# Patient Record
Sex: FEMALE | Race: WHITE | NOT HISPANIC OR LATINO | Employment: OTHER | URBAN - METROPOLITAN AREA
[De-identification: names, ages, dates, MRNs, and addresses within clinical notes are randomized per-mention and may not be internally consistent; named-entity substitution may affect disease eponyms.]

---

## 2017-02-17 RX ORDER — SELEXIPAG 800 UG/1
TABLET, COATED ORAL
Qty: 60 TABLET | Refills: 4 | Status: SHIPPED | OUTPATIENT
Start: 2017-02-17

## 2018-05-08 ENCOUNTER — TELEPHONE (OUTPATIENT)
Dept: GASTROENTEROLOGY | Facility: CLINIC | Age: 58
End: 2018-05-08

## 2018-05-08 NOTE — TELEPHONE ENCOUNTER
Spoke with patient and scheduled new patient appointment with Dr. Wallace.    Patient confirmed appointment and reminder mailed out.

## 2018-05-21 ENCOUNTER — TELEPHONE (OUTPATIENT)
Dept: ENDOSCOPY | Facility: HOSPITAL | Age: 58
End: 2018-05-21

## 2018-05-21 ENCOUNTER — TELEPHONE (OUTPATIENT)
Dept: GASTROENTEROLOGY | Facility: CLINIC | Age: 58
End: 2018-05-21

## 2018-05-21 ENCOUNTER — OFFICE VISIT (OUTPATIENT)
Dept: GASTROENTEROLOGY | Facility: CLINIC | Age: 58
End: 2018-05-21
Payer: MEDICAID

## 2018-05-21 VITALS
HEIGHT: 69 IN | BODY MASS INDEX: 22.79 KG/M2 | SYSTOLIC BLOOD PRESSURE: 95 MMHG | HEART RATE: 58 BPM | WEIGHT: 153.88 LBS | DIASTOLIC BLOOD PRESSURE: 63 MMHG

## 2018-05-21 DIAGNOSIS — Z12.11 SPECIAL SCREENING FOR MALIGNANT NEOPLASMS, COLON: Primary | ICD-10-CM

## 2018-05-21 DIAGNOSIS — R19.8 ALTERNATING CONSTIPATION AND DIARRHEA: ICD-10-CM

## 2018-05-21 DIAGNOSIS — R10.9 CENTRAL ABDOMINAL PAIN: Primary | ICD-10-CM

## 2018-05-21 DIAGNOSIS — R11.2 NAUSEA AND VOMITING, INTRACTABILITY OF VOMITING NOT SPECIFIED, UNSPECIFIED VOMITING TYPE: ICD-10-CM

## 2018-05-21 DIAGNOSIS — K21.00 GASTROESOPHAGEAL REFLUX DISEASE WITH ESOPHAGITIS: ICD-10-CM

## 2018-05-21 DIAGNOSIS — R13.19 ESOPHAGEAL DYSPHAGIA: ICD-10-CM

## 2018-05-21 DIAGNOSIS — R15.1 FECAL SOILING: ICD-10-CM

## 2018-05-21 PROCEDURE — 99214 OFFICE O/P EST MOD 30 MIN: CPT | Mod: PBBFAC | Performed by: NURSE PRACTITIONER

## 2018-05-21 PROCEDURE — 99999 PR PBB SHADOW E&M-EST. PATIENT-LVL IV: CPT | Mod: PBBFAC,,, | Performed by: NURSE PRACTITIONER

## 2018-05-21 PROCEDURE — 99205 OFFICE O/P NEW HI 60 MIN: CPT | Mod: S$PBB,,, | Performed by: NURSE PRACTITIONER

## 2018-05-21 RX ORDER — DIGOXIN 125 MCG
125 TABLET ORAL DAILY
COMMUNITY

## 2018-05-21 RX ORDER — POLYETHYLENE GLYCOL 3350, SODIUM SULFATE ANHYDROUS, SODIUM BICARBONATE, SODIUM CHLORIDE, POTASSIUM CHLORIDE 236; 22.74; 6.74; 5.86; 2.97 G/4L; G/4L; G/4L; G/4L; G/4L
4 POWDER, FOR SOLUTION ORAL ONCE
Qty: 4000 ML | Refills: 0 | Status: SHIPPED | OUTPATIENT
Start: 2018-05-21 | End: 2018-05-21

## 2018-05-21 RX ORDER — MYCOPHENOLATE MOFETIL 500 MG/1
500 TABLET ORAL 2 TIMES DAILY
COMMUNITY

## 2018-05-21 RX ORDER — MIRTAZAPINE 30 MG/1
30 TABLET, FILM COATED ORAL NIGHTLY
COMMUNITY

## 2018-05-21 NOTE — LETTER
May 21, 2018      Charlette Aguila MD  [address]  [facility] [address]           [facility] - Gastroenterology  [address]  [facility] [address]  Phone: [phone]  Fax: [phone]          Patient: Ewa Mills   MR Number: [id number]   YOB: 1960   Date of Visit: 5/21/2018       Dear Dr. Charlette Aguila:    Thank you for referring Ewa Mills to me for evaluation. Attached you will find relevant portions of my assessment and plan of care.    If you have questions, please do not hesitate to call me. I look forward to following Ewa Mills along with you.    Sincerely,    Sharon Wallace M.D.  Medical Director, GI [facility]  Department of Gastroenterology    ANY De, FNP-C  GI [facility]  Department of Gastroenterology    Enclosure  CC:  No Recipients    If you would like to receive this communication electronically, please contact [email or web address] or [phone] to request more information on [facility] access.    For providers and/or their staff who would like to refer a patient to [facility], please contact us through our one-stop-shop provider referral line, [facility], at [phone].    If you feel you have received this communication in error or would no longer like to receive these types of communications, please e-mail [email or web address]

## 2018-05-21 NOTE — TELEPHONE ENCOUNTER
Please review this case w anesthesia to make sure they do not want any additional records prior to the procedure. PT w CHF, pulm HTN on meds. Reports chest pain while swallowing.      Thank you so much  ML

## 2018-05-21 NOTE — PATIENT INSTRUCTIONS
For gas and bloating:  Eliminate all forms of dairy/lactose (milk, cheese, butter, creamers, ice cream etc) and artificial sweeteners (splenda, equal, stevia, truvia, crystal lite, diet sodas, sugar free candy/gum etc) from your diet for 10 days to see if your symptoms improve.    For nausea and vomiting:  Start the gastroparesis diet (see your handout provided)  We have referred you to the gastro dietitian to help with this.    For abdominal pain:  Take over the counter IBgard.     For bowel habits:  Start over the counter metamucil tablets: once daily then increase to twice daily after two weeks if needed.     For stool accidents/leakage:   Start kegel exercises (see the handout provided).

## 2018-05-21 NOTE — TELEPHONE ENCOUNTER
Per Dr. Curry (pul).  PT w NYHA functional class 3 PH w chronic heart failure. Stable symptoms.

## 2018-05-21 NOTE — PROGRESS NOTES
[facility] Consultation [facility]    Reason for Consult:    Chief Complaint   Patient presents with    Heartburn    Chest Pain    Dysphagia    Nausea    Emesis    Abdominal Pain    Gas    Bloated    Diarrhea    Constipation    Other     stool leakage         PCP:   Jalil Zurita   [address] [facility] / [place] [address]    Referring MD:  Charlette Aguila Md  [address] [place] [address]     Previous GI:Dr. Serrato ([id number])     Pulmonology: Dr. Jalil Zurita     Cardiology: Dr. Marissa Martino    HPI:  Ewa Mills is a 57 y.o. female with a  PMH of systemic sclerosis, CREST, pulmonary HTN, neuropathy, CHF referred to motility clinic for second opinion regarding the following problems:    GERD.  Patient reports bothersome heartburn  Retrosternal pyrosis:yes  Regurgitation:sometimes  Belching:yes  Onset: 10 years  Frequency: 3-4 days weekly  Improve with: some improvement with omeprazole 40 mg BID. No improvement with pantoprazole BID   Upright symptoms: yes  Nocturnal symptoms:  Yes. worse  Hoarseness:yes  Cough:no  Throat clearing:no  Food Triggers:none  Caffeine intake:no  Sleeps propped up on pillows  Avoids eating prior to bedtime.      Chest pain. Reports bothersome chest pain and spasms of esophagus. Feels pain in RU chest with swallowing  Onset: 6 months ago  Frequency: few times weekly       Triggers (cold fluids, caffeine, smoking, ETOH):swallowing. Brian cold fluids  Consumes mostly soft foods and liquids:regular diet  Caffeine intake:none  Negative cardiac workup:  Followed by cardiologist Dr Marissa Martino in [place]. Last visit last weel. Was told s/s are r/t pulmonary disease. Has CHF.  2D echo few weeks ago. ?heart cath? few weeks ago. Right heart cath 2014. EKD 2013. Stress test 2013.     Some improvement in dysphagia with diltiazem 360 mg daily but no improvement in pain.   Has not tried nifedipine, peppermint oil, isosorbide  dinitrate, sildenafil, trazodone, botox    Dysphagia.  Reports difficulty swallowing.  Onset of symptoms:4-5 years  Problems with solids:yes  Problems with liquids:yes  Choking while eating:no  Coughing while eating: no  Location: upper esophagus, distal esophagus  Frequency:  daily  Improves with:swallow precautions, washing with mountain dew. Sometimes has to spit materials up. No improvement with esophageal dilation  History of food impactions requiring ED visit:no  History of allergies:medications  History of seasonal allergies:no  History of food allergies:no  History of eczema:no    Nausea.  Early satiety.  Frequency:few days weekly  Onset: few weeks    Vomiting  Frequency:only if spitting up esophageal bolus  Onset:1 month  Timing of vomiting:  Within 30 min of eating:yes   Within 3 hours after eating: no    Symptoms occur within 10 minutes of finishing a meal:yes  Regurgitate lacks sour or bitter taste:yes  Regurgitate described as tasting similar to the food recently ingested:yes. Then acidic afterwards  Related to belching:no    Cyclical episodes of n/v with symptom free intervals between episodes:no  History of migraines:no PH. No FH  Marijuana use:no  Unusual bathing behaviors:no    Improves with:  Allergic to zofran (rash)  Has not tried phenergan, compazine, reglan, domperidone, scopolamine patch    Gastroparesis diagnosed: 2016  Etiology:    Dm:no  Idopathic  Postsurgical:no  Parkinsonism:no  Amyloidosis:no  Paraneoplastic disease:no  Scleroderma: yes  Mesenteric ischemia:no    History of:  Prior gastric or bariatric surgery: no  Diabetes mellitus: no  Thyroid dysfunction: hypothyroidism  Neurological disease: no   Autoimmune disorders: scleroderm    Number of GP related hospitalization in the past year: no  Number of GP related ED visit in the past year: no    Interventions: (pyloroplasty, botox, GP):no  No Gastric Stimulator      Curenlty on following medications that may affect gastric emptying:    Narcotics (morphine, oxycodone, tapentadol, less with tramadol):no  Anticholinergics: no  Cyclosporine:no  Diabetic medications (GLP-1 analogs, Exenatide, Lixisenatide, Livaglutide, Albiglutide, DulaglutatidePramlintide - SymlinPen (injection)):no    Abdominal pain. Reports abdominal pain  Character:ache  Location:central  Frequency: daily   Duration:constant  Onset:3 weeks ago  Worse with:no triggers identified  Improves with:some improvement with topical essential oils.  Associated with Bm: no  Has not tried IBgard, Bentyl, Levsin, Levbid.  Antidepressants:Mirazepine 30 mg daily  Using narcotic pain medication: no     Gas and bloating. Worse in the last two weeks   Bloating: yes  Excessive gas: yes  Abdominal distension: yes   Symptoms get worse after meals:yes  Symptoms get worse as the day progresses:  no  Consumes lactose:yes  Consumes artificial sugars:no    Diarrhea. X two weeks. 3 times weekly with 3 bristol type 7 stools/day.   No nocturnal stools. No meds    Constipation. X few weeks.  3 days weekly with 1 bristol type 3 BM/day. No meds. No fiber supplements.    Stool leakage. Slimy light yellow-brown material/stool. Daily. Small amounts x 2 months.    Prior to few weeks ago with normal BMs. With urgency x 6 months.    Anxiety>depression. Situational. Feels it is well controlled. Prays a lot.  Has seen psychology 20 yrs ago for situational anxiety. Taking mirtazapine 30 mg daily for abd pain per GI Dr. Serrato.    Some insomnia. No meds. Has seen sleep specialist last year.      Systemic sclerosis. CREST, Sjogren's. Raynauds, Pulmonary HTN. Taking cellcept 1500 mg BID, plaquenil 200 mg BID, Uptravi 800 mg BID, and adcirca 20 mg BID per Dr. Aguila and      Neuropathy. Left foot. Taking Neurontin 600 mg AM and 900 mg HS per PCP.    Hypothyroidism. Taking synthroid per PCP.    H/o weight loss. Currently weight stable. Taking ensure PRN per local dietitian's recommendations.     Denies  dysphagia, GERD, nausea, vomiting, early satiety, abdominal pain, bloating, diarrhea, constipation,       Total visit time was 90 minutes, more than 50% of which was spent in face-to-face counseling with patient regarding symptoms, diagnostic results, prognosis, risks and benefits of treatment options, instructions for management, importance of compliance with chosen treatment options, risk factor reduction, stress reduction, coping strategies.      Previous Studies:   CT angio abd with bilat extrem runoff 5/15/18: slightly prominent spleen at 13 cm. S/p hysterectomy. Mild levo curvature of lumbar spine. Spine degeneration. Minimal atelectasis.   EKG 5/14/18: sinus andra with 1 st degree AV block w/ premature supraventricular complexes. Incomplete right BBB. Inferior and anterolateral ischemia.   Cardiac benny 4/25/18: severe pulm HTN  DEVYN 4/20/18: mild moderate pulmonary HTN.   *EGD 2017: food in stomach, gastric ulcers  Chest xray 10/19/16: NL  GES 9/16/16: delayed gastric emptying time. T ½ 123.62 min. GERD and/ or HH (retained activity in distal esophagus).  Esophageal manometry 6/4/16: aperistalsis. IRP NL 2.1; 100% failed contractions. No HH.   EGD 4/19/16: LA grade C esophagitis. Gastric erythema (chr inactive gastritis). 3 small non bleeding duodenal angioectasias.   Pelvic us 2/3/16: limited access of left ovary. NL  *colon >3 years ago: ?    Labs:   4/10/18: CMP GFR low 56 ml/min  cortisol NL  BNP high 1,110  CBC hgb low 11.8, mch low 25.9, RDW high 17.0  8/14/17: TSH high 7.6    Relevant surgeries:  PEG x 9 months (2014)    *per pt report      ROS:  ROS   Constitutional: No fevers, no chills, no night sweats, no weight loss  ENT: No congestion, + rhinorrhea, no chronic sinus problems  CV: + chest pain, +palpitations  Pulm: No cough, + shortness of breath  Ophtho: No blurry vision, no eye redness  GI: see HPI  Derm: No rash  Heme: No lymphadenopathy, no bruising  MSK: +joint pain, no joint swelling, no  Raynauds  : No dysuria, no frequent urination, no blood in urine  Endo: No hot or cold intolerance  Neuro: No dizziness, no syncope, no seizure  Psych: + anxiety, no depression        Medical History:   Past Medical History:   Diagnosis Date    CHF (congestive heart failure)     CREST syndrome     Gastroparesis     GERD (gastroesophageal reflux disease)     Hypothyroidism     Neuropathy     Pulmonary HTN     Sjogren's disease         Surgical History:   Past Surgical History:   Procedure Laterality Date     SECTION      x 4    CHOLECYSTECTOMY      COLONOSCOPY      HYSTERECTOMY      UPPER GASTROINTESTINAL ENDOSCOPY          Family History:   Family History   Problem Relation Age of Onset    Diabetes Mother     Heart disease Mother     Rheum arthritis Mother     Diabetes Father     Heart disease Father     Colon cancer Father 62    Rheum arthritis Father     Rheum arthritis Maternal Grandmother     Celiac disease Neg Hx     Cirrhosis Neg Hx     Crohn's disease Neg Hx     Cystic fibrosis Neg Hx     Esophageal cancer Neg Hx     Hemochromatosis Neg Hx     Liver cancer Neg Hx     Liver disease Neg Hx     Rectal cancer Neg Hx     Stomach cancer Neg Hx     Kimani's disease Neg Hx     Lymphoma Neg Hx     Tuberculosis Neg Hx     Scleroderma Neg Hx     Multiple sclerosis Neg Hx     Melanoma Neg Hx     Lupus Neg Hx     Psoriasis Neg Hx     Skin cancer Neg Hx         Social History:   Social History     Social History    Marital status:      Spouse name: N/A    Number of children: N/A    Years of education: N/A     Social History Main Topics    Smoking status: Never Smoker    Smokeless tobacco: Never Used    Alcohol use No    Drug use: No    Sexual activity: Not Asked     Other Topics Concern    None     Social History Narrative    None        Review of patient's allergies indicates:   Allergen Reactions    Ondansetron hcl (pf) Rash     Pt given iv zofran, pt  "with redness above site and itching    Morphine Itching    Morphine sulfate      Itchy (skin)^    Tramadol Itching       Current Outpatient Prescriptions   Medication Sig Dispense Refill    ADCIRCA 20 mg Tab once daily.       diltiazem (CARDIZEM) 60 MG tablet 360 mg once daily.       furosemide (LASIX) 80 MG tablet Take by mouth 2 (two) times daily.       gabapentin (NEURONTIN) 300 MG capsule 300 mg.       hydroxychloroquine (PLAQUENIL) 200 mg tablet 2 (two) times daily.       LETAIRIS 10 mg Tab once daily.       levothyroxine (SYNTHROID) 88 MCG tablet Take 125 mcg by mouth once daily.       mirtazapine (REMERON) 30 MG tablet Take 30 mg by mouth every evening.      mycophenolate (CELLCEPT) 500 mg Tab Take 500 mg by mouth 2 (two) times daily.      omeprazole (PRILOSEC) 40 MG capsule 2 (two) times daily before meals.       UPTRAVI 800 mcg Tab TAKE 1 TABLET BY MOUTH TWICE A DAY AS DIRECTED 60 tablet 4    digoxin (LANOXIN) 125 mcg tablet Take 125 mcg by mouth once daily.      polyethylene glycol (GOLYTELY,NULYTELY) 236-22.74-6.74 -5.86 gram suspension Take 4,000 mLs (4 L total) by mouth once. 4000 mL 0     No current facility-administered medications for this visit.         Objective Findings:  Vital Signs:  BP 95/63   Pulse (!) 58   Ht 5' 9" (1.753 m)   Wt 69.8 kg (153 lb 14.1 oz)   BMI 22.72 kg/m²   Body mass index is 22.72 kg/m².    Physical Exam:  General appearance: alert, cooperative, no distress, no narrowing of oral aperture.   HENT: Normocephalic, atraumatic, neck symmetrical, no nasal discharge  Eyes: conjunctivae/corneas clear, PERRL, EOM's intact  Lungs: clear to auscultation bilaterally, no dullness to percussion bilaterally  Heart: regular rate and rhythm without rub; no displacement of the PMI  Abdomen: soft, non-tender; bowel sounds normoactive; no organomegaly  Extremities: extremities symmetric; no clubbing, cyanosis, or edema,  +raynauds,   No sclerodactyly, osteolysis of digits " nor digital ulcers.  Integument: Skin color, texture, turgor normal; no rashes; hair distrubution normal,  No telangectasia, notightness of skin.    Neurologic: Alert and oriented X 3, normal strength, normal coordination and gait  Psychiatric: no pressured speech; normal affect; no evidence of impaired cognition      Labs:  Lab Results   Component Value Date    WBC 2.86 (L) 01/29/2013    HGB 12.5 01/29/2013    HCT 38.4 01/29/2013    MCV 88.1 01/29/2013     (L) 01/29/2013     No results found for: FERRITIN  Lab Results   Component Value Date     01/29/2013    K 3.9 01/29/2013     01/29/2013    CO2 24.6 01/29/2013    GLU 83 01/29/2013    BUN 13 01/29/2013    CREATININE 1.1 01/29/2013    CALCIUM 8.3 (L) 01/29/2013    PROT 7.0 01/28/2013    ALBUMIN 3.7 01/28/2013    BILITOT 0.6 01/28/2013    ALKPHOS 90 01/28/2013    AST 19 01/28/2013    ALT 28 01/28/2013     Lab Results   Component Value Date    TSH 4.328 (H) 03/31/2015     No results found for: SEDRATE  No results found for: CRP  No results found for: LABA1C, HGBA1C        Assessment and Plan:  Ewa Mills is a 57 y.o. female with a  PMH of systemic sclerosis, CREST, pulmonary HTN, neuropathy, CHF referred to motility clinic for second opinion regarding the following problems:    GERD.  La grade C esophagtitis  No improvement with pantoprazole 40 mg BID  -Continue omeprazole 40 mg BID   -Will consider adding gaviscon with alginic acid    Chest pain associated with swallowing. Followed by cardiology for CHF. Manometry w absent contractility   Some improvement in dysphagia with diltiazem 360 mg daily but no improvement in pain.     Dysphagia.  Esophageal manometry with aperistalsis. Imaging with retained materials in distal esophagus.   Manometry w absent contractility   PEG x 9 months in 2014  No improvement with esophageal dilation  -EGD w/ e/g/d biopsies. 2nd floor procedure. High risk due to food in stomach/esophagus on previous testing,  gastroparesis, CHF, pulmonary hypertension.    Nausea.  Early satiety. Vomiting only associated w dysphagia. Gastroparesis related to Systemic Sclerosis (delayed gastric emptying in 2016)  Unable to tolerate zofran due to rash.  -EGD  -Discussed pathophysiology, prognosis, workup and treatment of gastroparesis, including surgical options.  Provided handout.   -Discussed gastroparesis diet, provided handout.    -Referral to GI dietitian  -Will consider reglan    Abdominal pain.   On Mirtazapine 30 mg daily  Some improvement with topical essential oils.  -Start IBgard    Gas and bloating. Distention.  Avoids artificial sugars.  -Eliminate lactose  -Will consider SIBO testing and treatment     Diarrhea and constipation: Diarrhea 3 days weekly, constipation 3 days weekly.  Urgency.   -Colonoscopy w R/l bx   -Start fiber     Fecal incontinence   -Daily fiber supplement  -Start kegels. Handout provided  -Will consider anorectal manometry and transrrectal US if above negative     Anemia. Hg 11.  MCV 79  -EGD/colon    Anxiety>depression. Situational.   Feels it is well controlled with prayer.   Taking mirtazapine 30 mg daily for abd pain per GI Dr. Serrato.    Some insomnia.  Has seen sleep specialist last year.      Systemic sclerosis. CREST, Sjogren's. Raynauds, Pulmonary HTN.   Taking cellcept 1500 mg BID, plaquenil 200 mg BID, Uptravi 800 mg BID, and adcirca 20 mg BID.  Continue to follow with Dr. Aguila and      Neuropathy. Left foot.   -Taking Neurontin 600 mg AM and 900 mg HS by PCP    Hypothyroidism. Levels still unstable   -Taking synthroid   -Continue to follow with PCP    Weight loss. Recently  weight stable.  -Seen by local dietitian.  Agree w supplementing w ensure at least daily   -Ref to GI dietian     Follow-up in about 3 months (around 8/21/2018).    1. Central abdominal pain    2. Fecal soiling    3. Alternating constipation and diarrhea    4. Nausea and vomiting, intractability of vomiting  not specified, unspecified vomiting type    5. Esophageal dysphagia    6. Gastroesophageal reflux disease with esophagitis          Order summary:  Orders Placed This Encounter    Case request GI: ESOPHAGOGASTRODUODENOSCOPY (EGD), COLONOSCOPY         Thank you so much for allowing me to participate in the care of ANY Triplett, FNP-C    I have personally reviewed history, performed physical exam, and educated the patient.  I have reviewed and agree with today's findings and the care plan outlined by Rosie Celaya NP.   Yojana Wallace MD

## 2018-05-22 ENCOUNTER — TELEPHONE (OUTPATIENT)
Dept: GASTROENTEROLOGY | Facility: CLINIC | Age: 58
End: 2018-05-22

## 2018-05-22 NOTE — TELEPHONE ENCOUNTER
Dr. Wallace,    Patient is scheduled on 2nd floor on 5/29/18 and Yenni scanned in most recent TTE under media tab for anesthesia review.     Thanks,    Kathrine

## 2018-05-22 NOTE — TELEPHONE ENCOUNTER
----- Message from Yojana Wallace MD sent at 5/21/2018  5:33 PM CDT -----  Pls get records from Dr. Henson for this pt

## 2018-05-29 ENCOUNTER — HOSPITAL ENCOUNTER (OUTPATIENT)
Facility: HOSPITAL | Age: 58
Discharge: HOME | End: 2018-05-29
Attending: INTERNAL MEDICINE | Admitting: INTERNAL MEDICINE
Payer: MEDICAID

## 2018-05-29 ENCOUNTER — ANESTHESIA (OUTPATIENT)
Dept: ENDOSCOPY | Facility: HOSPITAL | Age: 58
End: 2018-05-29
Payer: MEDICAID

## 2018-05-29 ENCOUNTER — ANESTHESIA EVENT (OUTPATIENT)
Dept: ENDOSCOPY | Facility: HOSPITAL | Age: 58
End: 2018-05-29
Payer: MEDICAID

## 2018-05-29 ENCOUNTER — SURGERY (OUTPATIENT)
Age: 58
End: 2018-05-29

## 2018-05-29 VITALS
BODY MASS INDEX: 22.22 KG/M2 | OXYGEN SATURATION: 100 % | RESPIRATION RATE: 12 BRPM | WEIGHT: 150 LBS | TEMPERATURE: 99 F | SYSTOLIC BLOOD PRESSURE: 129 MMHG | HEART RATE: 59 BPM | DIASTOLIC BLOOD PRESSURE: 65 MMHG | HEIGHT: 69 IN

## 2018-05-29 DIAGNOSIS — R19.7 DIARRHEA: ICD-10-CM

## 2018-05-29 DIAGNOSIS — R00.1 BRADYCARDIA: ICD-10-CM

## 2018-05-29 DIAGNOSIS — R19.7 DIARRHEA, UNSPECIFIED TYPE: Primary | ICD-10-CM

## 2018-05-29 PROCEDURE — 88305 TISSUE EXAM BY PATHOLOGIST: CPT | Mod: 26,,, | Performed by: PATHOLOGY

## 2018-05-29 PROCEDURE — 45380 COLONOSCOPY AND BIOPSY: CPT | Performed by: INTERNAL MEDICINE

## 2018-05-29 PROCEDURE — 25000003 PHARM REV CODE 250: Performed by: NURSE ANESTHETIST, CERTIFIED REGISTERED

## 2018-05-29 PROCEDURE — 45380 COLONOSCOPY AND BIOPSY: CPT | Mod: ,,, | Performed by: INTERNAL MEDICINE

## 2018-05-29 PROCEDURE — 43239 EGD BIOPSY SINGLE/MULTIPLE: CPT | Performed by: INTERNAL MEDICINE

## 2018-05-29 PROCEDURE — 43239 EGD BIOPSY SINGLE/MULTIPLE: CPT | Mod: 51,,, | Performed by: INTERNAL MEDICINE

## 2018-05-29 PROCEDURE — 88305 TISSUE EXAM BY PATHOLOGIST: CPT | Performed by: PATHOLOGY

## 2018-05-29 PROCEDURE — 63600175 PHARM REV CODE 636 W HCPCS: Performed by: NURSE ANESTHETIST, CERTIFIED REGISTERED

## 2018-05-29 PROCEDURE — 88312 SPECIAL STAINS GROUP 1: CPT | Mod: 26,,, | Performed by: PATHOLOGY

## 2018-05-29 PROCEDURE — D9220A PRA ANESTHESIA: Mod: CRNA,,, | Performed by: NURSE ANESTHETIST, CERTIFIED REGISTERED

## 2018-05-29 PROCEDURE — 93010 ELECTROCARDIOGRAM REPORT: CPT | Mod: ,,, | Performed by: INTERNAL MEDICINE

## 2018-05-29 PROCEDURE — 37000008 HC ANESTHESIA 1ST 15 MINUTES: Performed by: INTERNAL MEDICINE

## 2018-05-29 PROCEDURE — 27201012 HC FORCEPS, HOT/COLD, DISP: Performed by: INTERNAL MEDICINE

## 2018-05-29 PROCEDURE — 37000009 HC ANESTHESIA EA ADD 15 MINS: Performed by: INTERNAL MEDICINE

## 2018-05-29 PROCEDURE — D9220A PRA ANESTHESIA: Mod: ANES,,, | Performed by: ANESTHESIOLOGY

## 2018-05-29 PROCEDURE — 25000003 PHARM REV CODE 250: Performed by: INTERNAL MEDICINE

## 2018-05-29 RX ORDER — LIDOCAINE HCL/PF 100 MG/5ML
SYRINGE (ML) INTRAVENOUS
Status: DISCONTINUED | OUTPATIENT
Start: 2018-05-29 | End: 2018-05-29

## 2018-05-29 RX ORDER — ETOMIDATE 2 MG/ML
INJECTION INTRAVENOUS
Status: DISCONTINUED | OUTPATIENT
Start: 2018-05-29 | End: 2018-05-29

## 2018-05-29 RX ORDER — EPHEDRINE SULFATE 50 MG/ML
INJECTION, SOLUTION INTRAVENOUS
Status: DISCONTINUED | OUTPATIENT
Start: 2018-05-29 | End: 2018-05-29

## 2018-05-29 RX ORDER — PROPOFOL 10 MG/ML
VIAL (ML) INTRAVENOUS CONTINUOUS PRN
Status: DISCONTINUED | OUTPATIENT
Start: 2018-05-29 | End: 2018-05-29

## 2018-05-29 RX ORDER — VASOPRESSIN 20 [USP'U]/ML
INJECTION, SOLUTION INTRAMUSCULAR; SUBCUTANEOUS
Status: DISCONTINUED | OUTPATIENT
Start: 2018-05-29 | End: 2018-05-29

## 2018-05-29 RX ORDER — SODIUM CHLORIDE 0.9 % (FLUSH) 0.9 %
3 SYRINGE (ML) INJECTION
Status: DISCONTINUED | OUTPATIENT
Start: 2018-05-29 | End: 2018-05-29 | Stop reason: HOSPADM

## 2018-05-29 RX ORDER — LIDOCAINE HYDROCHLORIDE 10 MG/ML
1 INJECTION, SOLUTION EPIDURAL; INFILTRATION; INTRACAUDAL; PERINEURAL ONCE
Status: DISCONTINUED | OUTPATIENT
Start: 2018-05-29 | End: 2018-05-29 | Stop reason: HOSPADM

## 2018-05-29 RX ORDER — FENTANYL CITRATE 50 UG/ML
25 INJECTION, SOLUTION INTRAMUSCULAR; INTRAVENOUS EVERY 5 MIN PRN
Status: DISCONTINUED | OUTPATIENT
Start: 2018-05-29 | End: 2018-05-29 | Stop reason: HOSPADM

## 2018-05-29 RX ORDER — KETAMINE HCL IN 0.9 % NACL 50 MG/5 ML
SYRINGE (ML) INTRAVENOUS
Status: DISCONTINUED | OUTPATIENT
Start: 2018-05-29 | End: 2018-05-29

## 2018-05-29 RX ORDER — MEPERIDINE HYDROCHLORIDE 25 MG/ML
12.5 INJECTION INTRAMUSCULAR; INTRAVENOUS; SUBCUTANEOUS ONCE AS NEEDED
Status: DISCONTINUED | OUTPATIENT
Start: 2018-05-29 | End: 2018-05-29 | Stop reason: HOSPADM

## 2018-05-29 RX ORDER — PROPOFOL 10 MG/ML
VIAL (ML) INTRAVENOUS
Status: DISCONTINUED | OUTPATIENT
Start: 2018-05-29 | End: 2018-05-29

## 2018-05-29 RX ORDER — SODIUM CHLORIDE 9 MG/ML
INJECTION, SOLUTION INTRAVENOUS CONTINUOUS
Status: DISCONTINUED | OUTPATIENT
Start: 2018-05-29 | End: 2018-05-29 | Stop reason: HOSPADM

## 2018-05-29 RX ADMIN — ETOMIDATE 4 MG: 2 INJECTION, SOLUTION INTRAVENOUS at 03:05

## 2018-05-29 RX ADMIN — PROPOFOL 25 MG: 10 INJECTION, EMULSION INTRAVENOUS at 02:05

## 2018-05-29 RX ADMIN — LIDOCAINE HYDROCHLORIDE 100 MG: 20 INJECTION, SOLUTION INTRAVENOUS at 02:05

## 2018-05-29 RX ADMIN — SODIUM CHLORIDE: 0.9 INJECTION, SOLUTION INTRAVENOUS at 01:05

## 2018-05-29 RX ADMIN — PROPOFOL 20 MG: 10 INJECTION, EMULSION INTRAVENOUS at 03:05

## 2018-05-29 RX ADMIN — ETOMIDATE 5 MG: 2 INJECTION, SOLUTION INTRAVENOUS at 02:05

## 2018-05-29 RX ADMIN — PROPOFOL 70 MCG/KG/MIN: 10 INJECTION, EMULSION INTRAVENOUS at 02:05

## 2018-05-29 RX ADMIN — Medication 15 MG: at 02:05

## 2018-05-29 RX ADMIN — VASOPRESSIN 1 UNITS: 20 INJECTION INTRAVENOUS at 02:05

## 2018-05-29 RX ADMIN — EPHEDRINE SULFATE 5 MG: 50 INJECTION, SOLUTION INTRAMUSCULAR; INTRAVENOUS; SUBCUTANEOUS at 02:05

## 2018-05-29 NOTE — ANESTHESIA POSTPROCEDURE EVALUATION
"Anesthesia Post Evaluation    Patient: Ewa Mills    Procedure(s) Performed: Procedure(s) (LRB):  ESOPHAGOGASTRODUODENOSCOPY (EGD) (N/A)  COLONOSCOPY (N/A)    Final Anesthesia Type: general  Patient location during evaluation: PACU  Patient participation: Yes- Able to Participate  Level of consciousness: awake and alert and oriented  Post-procedure vital signs: reviewed and stable  Pain management: adequate  Airway patency: patent  PONV status at discharge: No PONV  Anesthetic complications: no      Cardiovascular status: blood pressure returned to baseline  Respiratory status: unassisted  Hydration status: euvolemic  Follow-up not needed.        Visit Vitals  /62   Pulse (!) 50   Temp 36.3 °C (97.3 °F) (Skin)   Resp 20   Ht 5' 9" (1.753 m)   Wt 68 kg (150 lb)   SpO2 100%   Breastfeeding? No   BMI 22.15 kg/m²       Pain/Tam Score: Pain Assessment Performed: Yes (5/29/2018  3:30 PM)  Presence of Pain: non-verbal indicators absent (5/29/2018  3:30 PM)  Tam Score: 8 (5/29/2018  3:30 PM)      "

## 2018-05-29 NOTE — ANESTHESIA RELEASE NOTE
"Anesthesia Release from PACU Note    Patient: Ewa Mills    Procedure(s) Performed: Procedure(s) (LRB):  ESOPHAGOGASTRODUODENOSCOPY (EGD) (N/A)  COLONOSCOPY (N/A)    Anesthesia type: general and MAC    Post pain: Adequate analgesia    Post assessment: no apparent anesthetic complications, tolerated procedure well and no evidence of recall    Last Vitals:   Visit Vitals  /62   Pulse (!) 50   Temp 36.3 °C (97.3 °F) (Skin)   Resp 20   Ht 5' 9" (1.753 m)   Wt 68 kg (150 lb)   SpO2 100%   Breastfeeding? No   BMI 22.15 kg/m²       Post vital signs: stable    Level of consciousness: awake, alert  and oriented    Nausea/Vomiting: no nausea/no vomiting    Complications: none    Airway Patency: patent    Respiratory: unassisted    Cardiovascular: stable and blood pressure at baseline    Hydration: euvolemic  "

## 2018-05-29 NOTE — PROGRESS NOTES
"MD Wallace arrived at bedside to address findings and POC with pt and . MD expressed concern for pt's low HR of 49. Pt reports feeling "fluttering" when HR runs low, and this events occurs at home as well. MD ordered 12 LED EKG and requested anesthesia to have pt evaluated prior to discharge. MD Wallace requested RN to follow up once interventions were completed. MD Ortiz arrived at bedside. Stated to read EKG prior to discharge and release from recovery.   "

## 2018-05-29 NOTE — ANESTHESIA POSTPROCEDURE EVALUATION
"Anesthesia Post Evaluation    Patient: Ewa Mills    Procedure(s) Performed: Procedure(s) (LRB):  ESOPHAGOGASTRODUODENOSCOPY (EGD) (N/A)  COLONOSCOPY (N/A)    Final Anesthesia Type: general  Patient location during evaluation: PACU  Patient participation: Yes- Able to Participate  Level of consciousness: awake and alert  Post-procedure vital signs: reviewed and stable  Pain management: adequate  Airway patency: patent  PONV status at discharge: No PONV  Anesthetic complications: no      Cardiovascular status: blood pressure returned to baseline  Respiratory status: unassisted and spontaneous ventilation  Hydration status: euvolemic  Follow-up not needed.        Visit Vitals  /62   Pulse (!) 50   Temp 36.3 °C (97.3 °F) (Skin)   Resp 20   Ht 5' 9" (1.753 m)   Wt 68 kg (150 lb)   SpO2 100%   Breastfeeding? No   BMI 22.15 kg/m²       Pain/Tam Score: Pain Assessment Performed: Yes (5/29/2018  3:30 PM)  Presence of Pain: non-verbal indicators absent (5/29/2018  3:30 PM)  Tam Score: 8 (5/29/2018  3:30 PM)      "

## 2018-05-29 NOTE — PROVATION PATIENT INSTRUCTIONS
Discharge Summary/Instructions after an Endoscopic Procedure  Patient Name: Ewa Mills  Patient MRN: [id number]  Patient YOB: 1960  Tuesday, May 29, 2018  Yojana Wallace MD  RESTRICTIONS:  During your procedure today, you received medications for sedation.  These   medications may affect your judgment, balance and coordination.  Therefore,   for 24 hours, you have the following restrictions:   - DO NOT drive a car, operate machinery, make legal/financial decisions,   sign important papers or drink alcohol.    ACTIVITY:  The following day: return to full activity including work, except no heavy   lifting, straining or running for 3 days if polyps were removed.  DIET:  Eat and drink normally unless instructed otherwise.     TREATMENT FOR COMMON SIDE EFFECTS:  - Mild abdominal pain, nausea, belching, bloating or excessive gas:  rest,   eat lightly and use a heating pad.  - Sore Throat: treat with throat lozenges and/or gargle with warm salt   water.  - Because air was used during the procedure, expelling large amounts of air   from your rectum or belching is normal.  - If a bowel prep was taken, you may not have a bowel movement for 1-3 days.    This is normal.  SYMPTOMS TO WATCH FOR AND REPORT TO YOUR PHYSICIAN:  1. Abdominal pain or bloating, other than gas cramps.  2. Chest pain.  3. Back pain.  4. Signs of infection such as: chills or fever occurring within 24 hours   after the procedure.  5. Rectal bleeding, which would show as bright red, maroon, or black stools.   (A tablespoon of blood from the rectum is not serious, especially if   hemorrhoids are present.)  6. Vomiting.  7. Weakness or dizziness.  GO DIRECTLY TO THE NEAREST EMERGENCY ROOM IF YOU HAVE ANY OF THE FOLLOWING:      Difficulty breathing              Chills and/or fever over 101 F   Persistent vomiting and/or vomiting blood   Severe abdominal pain   Severe chest pain   Black, tarry stools   Bleeding- more than one tablespoon   Any  other symptom or condition that you feel may need urgent attention  Your doctor recommends these additional instructions:  If any biopsies were taken, your doctors clinic will contact you in 1 to 2   weeks with any results.  - Await pathology results.   - Discharge patient to home (with escort).   - Resume previous diet.   - Continue present medications.   - Return to my office as previously scheduled.   - The findings and recommendations were discussed with the patient.   - Patient has a contact number available for emergencies.  The signs and   symptoms of potential delayed complications were discussed with the   patient.  Return to normal activities tomorrow.  Written discharge   instructions were provided to the patient.  For questions, problems or results please call your physician - Yojana Wallace MD at Work:  [phone].  [facility], EMERGENCY ROOM PHONE NUMBER: [phone]  IF A COMPLICATION OR EMERGENCY SITUATION ARISES AND YOU ARE UNABLE TO REACH   YOUR PHYSICIAN - GO DIRECTLY TO THE EMERGENCY ROOM.  Yojana Wallace MD  5/29/2018 2:48:30 PM  This report has been verified and signed electronically.  [employer]

## 2018-05-29 NOTE — ANESTHESIA PREPROCEDURE EVALUATION
05/29/2018  Ewa Mills is a 57 y.o., female.    Anesthesia Evaluation    I have reviewed the Patient Summary Reports.    I have reviewed the Nursing Notes.   I have reviewed the Medications.     Review of Systems  Anesthesia Hx:  No problems with previous Anesthesia  Denies Family Hx of Anesthesia complications.   Denies Personal Hx of Anesthesia complications.   Cardiovascular:   CHF NL EF  PA Pressure 45-50 mmHg Functional Capacity good / => 4 METS    Hepatic/GI:   GERD Gastroparesis - liquids x7 days   Endocrine:   Hypothyroidism        Physical Exam   Airway/Jaw/Neck:  Airway Findings: Mouth Opening: Normal Tongue: Normal  General Airway Assessment: Adult, Good  Mallampati: II  TM Distance: Normal, at least 6 cm      Dental:  Dental Findings: Upper Dentures   Chest/Lungs:  Chest/Lungs Findings: Normal Respiratory Rate         Mental Status:  Mental Status Findings:  Cooperative, Alert and Oriented         Anesthesia Plan  Type of Anesthesia, risks & benefits discussed:  Anesthesia Type:  general  Patient's Preference: General  Intra-op Monitoring Plan: standard ASA monitors  Intra-op Monitoring Plan Comments:   Post Op Pain Control Plan: per primary service following discharge from PACU  Post Op Pain Control Plan Comments: Per primary service  Induction:   IV  Beta Blocker:  Patient is not currently on a Beta-Blocker (No further documentation required).       Informed Consent: Patient understands risks and agrees with Anesthesia plan.  Questions answered. Anesthesia consent signed with patient.  ASA Score: 4     Day of Surgery Review of History & Physical:    H&P update referred to the surgeon.     Anesthesia Plan Notes: General mask airway        Ready For Surgery From Anesthesia Perspective.

## 2018-05-29 NOTE — TRANSFER OF CARE
"Anesthesia Transfer of Care Note    Patient: Ewa Mills    Procedure(s) Performed: Procedure(s) (LRB):  ESOPHAGOGASTRODUODENOSCOPY (EGD) (N/A)  COLONOSCOPY (N/A)    Patient location: [facility]    Anesthesia Type: general    Transport from OR: Transported from OR on 2-3 L/min O2 by NC with adequate spontaneous ventilation    Post pain: adequate analgesia    Post assessment: no apparent anesthetic complications and tolerated procedure well    Post vital signs: stable    Level of consciousness: sedated    Nausea/Vomiting: no nausea/vomiting    Complications: none    Transfer of care protocol was followed      Last vitals:   Visit Vitals  /67 (BP Location: Left arm, Patient Position: Sitting)   Pulse (!) 59   Temp 37 °C (98.6 °F) (Temporal)   Resp 17   Ht 5' 9" (1.753 m)   Wt 68 kg (150 lb)   SpO2 (!) 91%   Breastfeeding? No   BMI 22.15 kg/m²     "

## 2018-05-29 NOTE — H&P (VIEW-ONLY)
[facility] Consultation [facility]    Reason for Consult:    Chief Complaint   Patient presents with    Heartburn    Chest Pain    Dysphagia    Nausea    Emesis    Abdominal Pain    Gas    Bloated    Diarrhea    Constipation    Other     stool leakage         PCP:   Jalil Zurita   [address] / [place] [address]    Referring MD:  Charlette Aguila Md  [address] [place] [address]     Previous GI:Dr. Serrato ([id number])     Pulmonology: Dr. Jalil Zurita     Cardiology: Dr. Marissa Martino    HPI:  Ewa Mills is a 57 y.o. female with a  PMH of systemic sclerosis, CREST, pulmonary HTN, neuropathy, CHF referred to motility clinic for second opinion regarding the following problems:    GERD.  Patient reports bothersome heartburn  Retrosternal pyrosis:yes  Regurgitation:sometimes  Belching:yes  Onset: 10 years  Frequency: 3-4 days weekly  Improve with: some improvement with omeprazole 40 mg BID. No improvement with pantoprazole BID   Upright symptoms: yes  Nocturnal symptoms:  Yes. worse  Hoarseness:yes  Cough:no  Throat clearing:no  Food Triggers:none  Caffeine intake:no  Sleeps propped up on pillows  Avoids eating prior to bedtime.      Chest pain. Reports bothersome chest pain and spasms of esophagus. Feels pain in RU chest with swallowing  Onset: 6 months ago  Frequency: few times weekly       Triggers (cold fluids, caffeine, smoking, ETOH):swallowing. Brian cold fluids  Consumes mostly soft foods and liquids:regular diet  Caffeine intake:none  Negative cardiac workup:  Followed by cardiologist Dr Marissa Martino in [place]. Last visit last weel. Was told s/s are r/t pulmonary disease. Has CHF.  2D echo few weeks ago. ?heart cath? few weeks ago. Right heart cath 2014. EKD 2013. Stress test 2013.     Some improvement in dysphagia with diltiazem 360 mg daily but no improvement in pain.   Has not tried nifedipine, peppermint oil, isosorbide  dinitrate, sildenafil, trazodone, botox    Dysphagia.  Reports difficulty swallowing.  Onset of symptoms:4-5 years  Problems with solids:yes  Problems with liquids:yes  Choking while eating:no  Coughing while eating: no  Location: upper esophagus, distal esophagus  Frequency:  daily  Improves with:swallow precautions, washing with mountain dew. Sometimes has to spit materials up. No improvement with esophageal dilation  History of food impactions requiring ED visit:no  History of allergies:medications  History of seasonal allergies:no  History of food allergies:no  History of eczema:no    Nausea.  Early satiety.  Frequency:few days weekly  Onset: few weeks    Vomiting  Frequency:only if spitting up esophageal bolus  Onset:1 month  Timing of vomiting:  Within 30 min of eating:yes   Within 3 hours after eating: no    Symptoms occur within 10 minutes of finishing a meal:yes  Regurgitate lacks sour or bitter taste:yes  Regurgitate described as tasting similar to the food recently ingested:yes. Then acidic afterwards  Related to belching:no    Cyclical episodes of n/v with symptom free intervals between episodes:no  History of migraines:no PH. No FH  Marijuana use:no  Unusual bathing behaviors:no    Improves with:  Allergic to zofran (rash)  Has not tried phenergan, compazine, reglan, domperidone, scopolamine patch    Gastroparesis diagnosed: 2016  Etiology:    Dm:no  Idopathic  Postsurgical:no  Parkinsonism:no  Amyloidosis:no  Paraneoplastic disease:no  Scleroderma: yes  Mesenteric ischemia:no    History of:  Prior gastric or bariatric surgery: no  Diabetes mellitus: no  Thyroid dysfunction: hypothyroidism  Neurological disease: no   Autoimmune disorders: scleroderm    Number of GP related hospitalization in the past year: no  Number of GP related ED visit in the past year: no    Interventions: (pyloroplasty, botox, GP):no  No Gastric Stimulator      Curenlty on following medications that may affect gastric emptying:    Narcotics (morphine, oxycodone, tapentadol, less with tramadol):no  Anticholinergics: no  Cyclosporine:no  Diabetic medications (GLP-1 analogs, Exenatide, Lixisenatide, Livaglutide, Albiglutide, DulaglutatidePramlintide - SymlinPen (injection)):no    Abdominal pain. Reports abdominal pain  Character:ache  Location:central  Frequency: daily   Duration:constant  Onset:3 weeks ago  Worse with:no triggers identified  Improves with:some improvement with topical essential oils.  Associated with Bm: no  Has not tried IBgard, Bentyl, Levsin, Levbid.  Antidepressants:Mirazepine 30 mg daily  Using narcotic pain medication: no     Gas and bloating. Worse in the last two weeks   Bloating: yes  Excessive gas: yes  Abdominal distension: yes   Symptoms get worse after meals:yes  Symptoms get worse as the day progresses:  no  Consumes lactose:yes  Consumes artificial sugars:no    Diarrhea. X two weeks. 3 times weekly with 3 bristol type 7 stools/day.   No nocturnal stools. No meds    Constipation. X few weeks.  3 days weekly with 1 bristol type 3 BM/day. No meds. No fiber supplements.    Stool leakage. Slimy light yellow-brown material/stool. Daily. Small amounts x 2 months.    Prior to few weeks ago with normal BMs. With urgency x 6 months.    Anxiety>depression. Situational. Feels it is well controlled. Prays a lot.  Has seen psychology 20 yrs ago for situational anxiety. Taking mirtazapine 30 mg daily for abd pain per GI Dr. Serrato.    Some insomnia. No meds. Has seen sleep specialist last year.      Systemic sclerosis. CREST, Sjogren's. Raynauds, Pulmonary HTN. Taking cellcept 1500 mg BID, plaquenil 200 mg BID, Uptravi 800 mg BID, and adcirca 20 mg BID per Dr. Aguila and      Neuropathy. Left foot. Taking Neurontin 600 mg AM and 900 mg HS per PCP.    Hypothyroidism. Taking synthroid per PCP.    H/o weight loss. Currently weight stable. Taking ensure PRN per local dietitian's recommendations.     Denies  dysphagia, GERD, nausea, vomiting, early satiety, abdominal pain, bloating, diarrhea, constipation,       Total visit time was 90 minutes, more than 50% of which was spent in face-to-face counseling with patient regarding symptoms, diagnostic results, prognosis, risks and benefits of treatment options, instructions for management, importance of compliance with chosen treatment options, risk factor reduction, stress reduction, coping strategies.      Previous Studies:   CT angio abd with bilat extrem runoff 5/15/18: slightly prominent spleen at 13 cm. S/p hysterectomy. Mild levo curvature of lumbar spine. Spine degeneration. Minimal atelectasis.   EKG 5/14/18: sinus andra with 1 st degree AV block w/ premature supraventricular complexes. Incomplete right BBB. Inferior and anterolateral ischemia.   Cardiac benny 4/25/18: severe pulm HTN  DEVYN 4/20/18: mild moderate pulmonary HTN.   *EGD 2017: food in stomach, gastric ulcers  Chest xray 10/19/16: NL  GES 9/16/16: delayed gastric emptying time. T ½ 123.62 min. GERD and/ or HH (retained activity in distal esophagus).  Esophageal manometry 6/4/16: aperistalsis. IRP NL 2.1; 100% failed contractions. No HH.   EGD 4/19/16: [place] grade C esophagitis. Gastric erythema (chr inactive gastritis). 3 small non bleeding duodenal angioectasias.   Pelvic us 2/3/16: limited access of left ovary. NL  *colon >3 years ago: ?    Labs:   4/10/18: CMP GFR low 56 ml/min  cortisol NL  BNP high 1,110  CBC hgb low 11.8, mch low 25.9, RDW high 17.0  8/14/17: TSH high 7.6    Relevant surgeries:  PEG x 9 months (2014)    *per pt report      ROS:  ROS   Constitutional: No fevers, no chills, no night sweats, no weight loss  ENT: No congestion, + rhinorrhea, no chronic sinus problems  CV: + chest pain, +palpitations  Pulm: No cough, + shortness of breath  Ophtho: No blurry vision, no eye redness  GI: see HPI  Derm: No rash  Heme: No lymphadenopathy, no bruising  MSK: +joint pain, no joint swelling, no  Raynauds  : No dysuria, no frequent urination, no blood in urine  Endo: No hot or cold intolerance  Neuro: No dizziness, no syncope, no seizure  Psych: + anxiety, no depression        Medical History:   Past Medical History:   Diagnosis Date    CHF (congestive heart failure)     CREST syndrome     Gastroparesis     GERD (gastroesophageal reflux disease)     Hypothyroidism     Neuropathy     Pulmonary HTN     Sjogren's disease         Surgical History:   Past Surgical History:   Procedure Laterality Date     SECTION      x 4    CHOLECYSTECTOMY      COLONOSCOPY      HYSTERECTOMY      UPPER GASTROINTESTINAL ENDOSCOPY          Family History:   Family History   Problem Relation Age of Onset    Diabetes Mother     Heart disease Mother     Rheum arthritis Mother     Diabetes Father     Heart disease Father     Colon cancer Father 62    Rheum arthritis Father     Rheum arthritis Maternal Grandmother     Celiac disease Neg Hx     Cirrhosis Neg Hx     Crohn's disease Neg Hx     Cystic fibrosis Neg Hx     Esophageal cancer Neg Hx     Hemochromatosis Neg Hx     Liver cancer Neg Hx     Liver disease Neg Hx     Rectal cancer Neg Hx     Stomach cancer Neg Hx     Kimani's disease Neg Hx     Lymphoma Neg Hx     Tuberculosis Neg Hx     Scleroderma Neg Hx     Multiple sclerosis Neg Hx     Melanoma Neg Hx     Lupus Neg Hx     Psoriasis Neg Hx     Skin cancer Neg Hx         Social History:   Social History     Social History    Marital status:      Spouse name: N/A    Number of children: N/A    Years of education: N/A     Social History Main Topics    Smoking status: Never Smoker    Smokeless tobacco: Never Used    Alcohol use No    Drug use: No    Sexual activity: Not Asked     Other Topics Concern    None     Social History Narrative    None        Review of patient's allergies indicates:   Allergen Reactions    Ondansetron hcl (pf) Rash     Pt given iv zofran, pt  "with redness above site and itching    Morphine Itching    Morphine sulfate      Itchy (skin)^    Tramadol Itching       Current Outpatient Prescriptions   Medication Sig Dispense Refill    ADCIRCA 20 mg Tab once daily.       diltiazem (CARDIZEM) 60 MG tablet 360 mg once daily.       furosemide (LASIX) 80 MG tablet Take by mouth 2 (two) times daily.       gabapentin (NEURONTIN) 300 MG capsule 300 mg.       hydroxychloroquine (PLAQUENIL) 200 mg tablet 2 (two) times daily.       LETAIRIS 10 mg Tab once daily.       levothyroxine (SYNTHROID) 88 MCG tablet Take 125 mcg by mouth once daily.       mirtazapine (REMERON) 30 MG tablet Take 30 mg by mouth every evening.      mycophenolate (CELLCEPT) 500 mg Tab Take 500 mg by mouth 2 (two) times daily.      omeprazole (PRILOSEC) 40 MG capsule 2 (two) times daily before meals.       UPTRAVI 800 mcg Tab TAKE 1 TABLET BY MOUTH TWICE A DAY AS DIRECTED 60 tablet 4    digoxin (LANOXIN) 125 mcg tablet Take 125 mcg by mouth once daily.      polyethylene glycol (GOLYTELY,NULYTELY) 236-22.74-6.74 -5.86 gram suspension Take 4,000 mLs (4 L total) by mouth once. 4000 mL 0     No current facility-administered medications for this visit.         Objective Findings:  Vital Signs:  BP 95/63   Pulse (!) 58   Ht 5' 9" (1.753 m)   Wt 69.8 kg (153 lb 14.1 oz)   BMI 22.72 kg/m²   Body mass index is 22.72 kg/m².    Physical Exam:  General appearance: alert, cooperative, no distress, no narrowing of oral aperture.   HENT: Normocephalic, atraumatic, neck symmetrical, no nasal discharge  Eyes: conjunctivae/corneas clear, PERRL, EOM's intact  Lungs: clear to auscultation bilaterally, no dullness to percussion bilaterally  Heart: regular rate and rhythm without rub; no displacement of the PMI  Abdomen: soft, non-tender; bowel sounds normoactive; no organomegaly  Extremities: extremities symmetric; no clubbing, cyanosis, or edema,  +raynauds,   No sclerodactyly, osteolysis of digits " nor digital ulcers.  Integument: Skin color, texture, turgor normal; no rashes; hair distrubution normal,  No telangectasia, notightness of skin.    Neurologic: Alert and oriented X 3, normal strength, normal coordination and gait  Psychiatric: no pressured speech; normal affect; no evidence of impaired cognition      Labs:  Lab Results   Component Value Date    WBC 2.86 (L) 01/29/2013    HGB 12.5 01/29/2013    HCT 38.4 01/29/2013    MCV 88.1 01/29/2013     (L) 01/29/2013     No results found for: FERRITIN  Lab Results   Component Value Date     01/29/2013    K 3.9 01/29/2013     01/29/2013    CO2 24.6 01/29/2013    GLU 83 01/29/2013    BUN 13 01/29/2013    CREATININE 1.1 01/29/2013    CALCIUM 8.3 (L) 01/29/2013    PROT 7.0 01/28/2013    ALBUMIN 3.7 01/28/2013    BILITOT 0.6 01/28/2013    ALKPHOS 90 01/28/2013    AST 19 01/28/2013    ALT 28 01/28/2013     Lab Results   Component Value Date    TSH 4.328 (H) 03/31/2015     No results found for: SEDRATE  No results found for: CRP  No results found for: LABA1C, HGBA1C        Assessment and Plan:  Ewa Mills is a 57 y.o. female with a  PMH of systemic sclerosis, CREST, pulmonary HTN, neuropathy, CHF referred to motility clinic for second opinion regarding the following problems:    GERD.  La grade C esophagtitis  No improvement with pantoprazole 40 mg BID  -Continue omeprazole 40 mg BID   -Will consider adding gaviscon with alginic acid    Chest pain associated with swallowing. Followed by cardiology for CHF. Manometry w absent contractility   Some improvement in dysphagia with diltiazem 360 mg daily but no improvement in pain.   -Will consider nifedipine, isosorbide dinitrate, sildenafil, trazodone, botox    Dysphagia.  Esophageal manometry with aperistalsis. Imaging with retained materials in distal esophagus.   Manometry w absent contractility   PEG x 9 months in 2014  No improvement with esophageal dilation  -EGD w/ e/g/d biopsies. 2nd  floor procedure. High risk due to food in stomach/esophagus on previous testing, gastroparesis, CHF, pulmonary hypertension.    Nausea.  Early satiety. Vomiting only associated w dysphagia. Gastroparesis related to Systemic Sclerosis (delayed gastric emptying in 2016)  Unable to tolerate zofran due to rash.  -EGD  -Discussed pathophysiology, prognosis, workup and treatment of gastroparesis, including surgical options.  Provided handout.   -Discussed gastroparesis diet, provided handout.    -Referral to GI dietitian  -Will consider reglan    Abdominal pain.   On Mirtazapine 30 mg daily  Some improvement with topical essential oils.  -Start IBgard    Gas and bloating. Distention.  Avoids artificial sugars.  -Eliminate lactose  -Will consider SIBO testing and treatment     Diarrhea and constipation: Diarrhea 3 days weekly, constipation 3 days weekly.  Urgency.   -Colonoscopy w R/l bx   -Start fiber     Fecal incontinence   -Daily fiber supplement  -Start kegels. Handout provided  -Will consider anorectal manometry and transrrectal US if above negative     Anemia. Hg 11.  MCV 79  -EGD/colon    Anxiety>depression. Situational.   Feels it is well controlled with prayer.   Taking mirtazapine 30 mg daily for abd pain per GI Dr. Serrato.    Some insomnia.  Has seen sleep specialist last year.      Systemic sclerosis. CREST, Sjogren's. Raynauds, Pulmonary HTN.   Taking cellcept 1500 mg BID, plaquenil 200 mg BID, Uptravi 800 mg BID, and adcirca 20 mg BID.  Continue to follow with Dr. Aguila and      Neuropathy. Left foot.   -Taking Neurontin 600 mg AM and 900 mg HS by PCP    Hypothyroidism. Levels still unstable   -Taking synthroid   -Continue to follow with PCP    Weight loss. Recently  weight stable.  -Seen by local dietitian.  Agree w supplementing w ensure at least daily   -Ref to GI dietian     Follow-up in about 3 months (around 8/21/2018).    1. Central abdominal pain    2. Fecal soiling    3. Alternating  constipation and diarrhea    4. Nausea and vomiting, intractability of vomiting not specified, unspecified vomiting type    5. Esophageal dysphagia    6. Gastroesophageal reflux disease with esophagitis          Order summary:  Orders Placed This Encounter    Case request GI: ESOPHAGOGASTRODUODENOSCOPY (EGD), COLONOSCOPY         Thank you so much for allowing me to participate in the care of Ewa Mills  Rosie Celaya, ANY, FNP-C    I have personally reviewed history, performed physical exam, and educated the patient.  I have reviewed and agree with today's findings and the care plan outlined by Rosie Celaya NP.   Yojana Wallace MD

## 2018-05-29 NOTE — INTERVAL H&P NOTE
The patient has been examined and the H&P has been reviewed:    I concur with the findings and no changes have occurred since H&P was written.    Anesthesia/Surgery risks, benefits and alternative options discussed and understood by patient/family.    Pre-Procedure H and P Addendum    Patient seen and examined.  History and exam unchanged from prior history and physical.      Procedure: EGD/colon   Indication: dysphagia,gerd, nasuea, diarrhea, anemia  ASA Class: per anesthesiology  Airway: per anesthesia  Neck Mobility: full range of motion  Mallampatti score: per anesthesia  History of anesthesia problems: no  Family history of anesthesia problems: no  Anesthesia Plan: per anesthesia        Active Hospital Problems    Diagnosis  POA    Diarrhea [R19.7]  Yes      Resolved Hospital Problems    Diagnosis Date Resolved POA   No resolved problems to display.

## 2018-05-29 NOTE — PROVATION PATIENT INSTRUCTIONS
Discharge Summary/Instructions after an Endoscopic Procedure  Patient Name: Ewa Mills  Patient MRN: [id number]  Patient YOB: 1960  Tuesday, May 29, 2018  Yojana Wallace MD  RESTRICTIONS:  During your procedure today, you received medications for sedation.  These   medications may affect your judgment, balance and coordination.  Therefore,   for 24 hours, you have the following restrictions:   - DO NOT drive a car, operate machinery, make legal/financial decisions,   sign important papers or drink alcohol.    ACTIVITY:  The following day: return to full activity including work, except no heavy   lifting, straining or running for 3 days if polyps were removed.  DIET:  Eat and drink normally unless instructed otherwise.     TREATMENT FOR COMMON SIDE EFFECTS:  - Mild abdominal pain, nausea, belching, bloating or excessive gas:  rest,   eat lightly and use a heating pad.  - Sore Throat: treat with throat lozenges and/or gargle with warm salt   water.  - Because air was used during the procedure, expelling large amounts of air   from your rectum or belching is normal.  - If a bowel prep was taken, you may not have a bowel movement for 1-3 days.    This is normal.  SYMPTOMS TO WATCH FOR AND REPORT TO YOUR PHYSICIAN:  1. Abdominal pain or bloating, other than gas cramps.  2. Chest pain.  3. Back pain.  4. Signs of infection such as: chills or fever occurring within 24 hours   after the procedure.  5. Rectal bleeding, which would show as bright red, maroon, or black stools.   (A tablespoon of blood from the rectum is not serious, especially if   hemorrhoids are present.)  6. Vomiting.  7. Weakness or dizziness.  GO DIRECTLY TO THE NEAREST EMERGENCY ROOM IF YOU HAVE ANY OF THE FOLLOWING:      Difficulty breathing              Chills and/or fever over 101 F   Persistent vomiting and/or vomiting blood   Severe abdominal pain   Severe chest pain   Black, tarry stools   Bleeding- more than one tablespoon   Any  other symptom or condition that you feel may need urgent attention  Your doctor recommends these additional instructions:  If any biopsies were taken, your doctors clinic will contact you in 1 to 2   weeks with any results.  - Discharge patient to home (with escort).   - Resume previous diet.   - Continue present medications.   - Await pathology results.   - Repeat colonoscopy in 5 years for screening purposes.   - Return to my office as previously scheduled.   - The findings and recommendations were discussed with the patient.   - Patient has a contact number available for emergencies.  The signs and   symptoms of potential delayed complications were discussed with the   patient.  Return to normal activities tomorrow.  Written discharge   instructions were provided to the patient.   For questions, problems or results please call your physician - Yojana Wallace MD at Work:  [phone].  [facility], EMERGENCY ROOM PHONE NUMBER: [phone]  IF A COMPLICATION OR EMERGENCY SITUATION ARISES AND YOU ARE UNABLE TO REACH   YOUR PHYSICIAN - GO DIRECTLY TO THE EMERGENCY ROOM.  Yojana Wallace MD  5/29/2018 3:40:00 PM  This report has been verified and signed electronically.  [employer]

## 2018-05-29 NOTE — PLAN OF CARE
Discharge instructions reviewed with pt and . Understanding verbalized. No complaints of pain reported. Pt able to tolerate po intake and urinate in restroom. MD Barcenas arrived at bedside to evaluate pt prior to discharge. MD Wallace notified of pt's current condition and MD Barcenas's recommendations. Pt and MD's in agreement with plan. Pt informed by MD barcenas to hold night cardiac medication. Pt transported to car by RN.

## 2018-05-31 ENCOUNTER — TELEPHONE (OUTPATIENT)
Dept: GASTROENTEROLOGY | Facility: CLINIC | Age: 58
End: 2018-05-31

## 2018-05-31 NOTE — TELEPHONE ENCOUNTER
----- Message from Yojana Wallace MD sent at 5/31/2018  4:03 PM CDT -----  Guerline,   Please fax to pt's cardiologist. PLs call her to check to see how she is feeling after her procedure.   Thanks  ML

## 2018-06-01 ENCOUNTER — TELEPHONE (OUTPATIENT)
Dept: GASTROENTEROLOGY | Facility: CLINIC | Age: 58
End: 2018-06-01

## 2018-06-01 NOTE — TELEPHONE ENCOUNTER
----- Message from Michelle Camejo sent at 6/1/2018  9:16 AM CDT -----  Contact: self - [phone]  nelliemi - returning your call - please call patient at

## 2018-06-04 ENCOUNTER — TELEPHONE (OUTPATIENT)
Dept: GASTROENTEROLOGY | Facility: CLINIC | Age: 58
End: 2018-06-04

## 2018-06-04 NOTE — TELEPHONE ENCOUNTER
----- Message from Ronel Egan sent at 6/4/2018 12:13 PM CDT -----  Contact: pt#[phone]  Needs Advice    Reason for call:    Pt wants the procedure notes from [facility] to  fax#[phone]   Communication Preference:[email or web address]  Additional Information:

## 2018-06-04 NOTE — TELEPHONE ENCOUNTER
Spoke with patient and faxed [facility] endoscopy to PCP.    Patient verified spelling of doctor's name and fax number.

## 2018-06-08 ENCOUNTER — TELEPHONE (OUTPATIENT)
Dept: GASTROENTEROLOGY | Facility: CLINIC | Age: 58
End: 2018-06-08

## 2018-06-08 DIAGNOSIS — B37.81 CANDIDA ESOPHAGITIS: Primary | ICD-10-CM

## 2018-06-08 RX ORDER — FLUCONAZOLE 200 MG/1
200 TABLET ORAL DAILY
Qty: 14 TABLET | Refills: 0 | Status: SHIPPED | OUTPATIENT
Start: 2018-06-08 | End: 2018-06-22

## 2018-06-08 NOTE — TELEPHONE ENCOUNTER
Pls let pt know     I have received the biopsy results from your recent endoscopic procedure(s). They include the following:     Yeast infection in the esophagus. I am sending a prescription for diflucan 200mg daily for 14 days      No other significant abnormality     Endoscopy and colonoscopy is not a perfect exam. Rarely a significant polyp or cancer can be missed. You should not ignore symptoms such as blood with bowel movements or abdominal pain and report these symptoms to your doctor if they occur.     I recommend repeat colonoscopy in 5 years    Should follow up as previously scheduled.     Sincerely,   Dr. Wallace

## 2018-06-11 NOTE — TELEPHONE ENCOUNTER
Please ask pt to try gaviscon w alginate TID for GERD.  She should continue to take omeprazole 40mg BID.     She had significant esophagitis and that is probably why she is having a hard time swallowing.  She should switch to full liquid diet (shakes, soups, yogurt, etc) until her symptoms improve.   .

## 2018-06-11 NOTE — TELEPHONE ENCOUNTER
Spoke with patient.    Reviewed the results and patient verbalizes understanding.    Patient is scheduled to see Dr. Wallace in August.    Patient reports having trouble swallowing for the past 4 days, Patient has lost 4 lbs in 2 days. Patient denies any vomiting unless food is stuck but has been feeling more nauseous than usual. Patient is trying to eat solids but only eats about 2-3 tablespoons, no trouble drinking liquids.    Since procedure patient is also having severe heartburn, Patient can not sleep laying down , she has to sleep sitting up.    Please advise.

## 2018-06-25 ENCOUNTER — TELEPHONE (OUTPATIENT)
Dept: GASTROENTEROLOGY | Facility: CLINIC | Age: 58
End: 2018-06-25

## 2018-06-25 DIAGNOSIS — R13.10 DYSPHAGIA, UNSPECIFIED TYPE: Primary | ICD-10-CM

## 2018-06-25 NOTE — TELEPHONE ENCOUNTER
Received a call from Dr. 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  He is concerend that this may be contributing to dysphagia.     Please ask pt to provide us with a CD of most recent Ct chest done at [facility] so that we can review it during our next swallowing conference.     Needs   Esophagogram w 13mm tablet (ideally arrange after CD uploaded so that radiology can review)   Esoph manometry.      Pls arrange  Follow up apt w me after studies completed.

## 2018-06-26 NOTE — TELEPHONE ENCOUNTER
Spoke to Ms. Mcneil    Explained below to her, she states she is going to audelia Shannon Latham's office tomorrow to see best way to get ct on disk mailed to our clinic since she lives in [place].    Let Ms. Mcneil know once we receive the disk we will proceed with scheduling her esophagogram, manometry, and clinic f/u with Dr. Wallace.    Pt verbalizes understanding and appreciates call.

## 2018-07-11 ENCOUNTER — TELEPHONE (OUTPATIENT)
Dept: GASTROENTEROLOGY | Facility: CLINIC | Age: 58
End: 2018-07-11

## 2018-07-11 NOTE — TELEPHONE ENCOUNTER
Returned patient's call. Patient is calling to see if Dr. Wallace received the disk of her CT scan from Dr. Latham's office. She is upset and stated, she spoke with Guerline on 6/25 and has not heard anything back from Dr. Wallace's office about whether or not the disk was received.    MA apoligized to the patient and informed her that I am not sure if the disk was received or not. Mrs. Mills verbalized understanding and stated she is seeing Dr. Latham's today and she will get a copy of her CT scan today and send it to Dr. Wallace.

## 2018-07-11 NOTE — TELEPHONE ENCOUNTER
----- Message from Shanna Fontanez MA sent at 7/10/2018 11:45 AM CDT -----  Contact:  [phone] (M)  Needs Advice    Reason for call:   Pt spoke to navya on 6/25 about a disc of her ct scan being sent by Dr. Latham's  Office to Dr Wallace, she has not heard back if the disc was received.       Communication Preference: [phone] (M)

## 2018-07-18 ENCOUNTER — TELEPHONE (OUTPATIENT)
Dept: GASTROENTEROLOGY | Facility: CLINIC | Age: 58
End: 2018-07-18

## 2018-07-18 NOTE — TELEPHONE ENCOUNTER
----- Message from Michelle Nell sent at 7/18/2018 12:41 PM CDT -----  Contact: self - [phone]  jennifer - has questions re her appts - please call patient at

## 2018-07-19 ENCOUNTER — TELEPHONE (OUTPATIENT)
Dept: GASTROENTEROLOGY | Facility: CLINIC | Age: 58
End: 2018-07-19

## 2018-07-19 NOTE — TELEPHONE ENCOUNTER
----- Message from Evelyn Arias sent at 7/19/2018  8:05 AM CDT -----  Contact: Self- [phone]  Madison- pt states she is returning a missed call from someone in Dr. Wallace's office- please contact pt at [phone]

## 2018-07-19 NOTE — TELEPHONE ENCOUNTER
----- Message from Chalo Mills MA sent at 7/19/2018 10:15 AM CDT -----  Contact: Pt  Pt called and would like a call back from Dr Wallace regarding a ct scan on a Dis.      Pt can be reached at [phone].      Thanks

## 2018-07-23 ENCOUNTER — TELEPHONE (OUTPATIENT)
Dept: GASTROENTEROLOGY | Facility: CLINIC | Age: 58
End: 2018-07-23

## 2018-07-23 NOTE — TELEPHONE ENCOUNTER
----- Message from Michelle Nell sent at 7/23/2018  3:21 PM CDT -----  Contact: self - [phone]  jennifer - did you get the copy of the cd of the ct - or can you schedule the ct - please call patient at

## 2018-07-25 ENCOUNTER — TELEPHONE (OUTPATIENT)
Dept: GASTROENTEROLOGY | Facility: CLINIC | Age: 58
End: 2018-07-25

## 2018-07-25 NOTE — TELEPHONE ENCOUNTER
Ma spoke with pt informed dr rodriguez has her cd and will be getting back with her once its reviewed, pt verbalized understanding

## 2018-07-25 NOTE — TELEPHONE ENCOUNTER
----- Message from Michelle Nell sent at 7/25/2018 12:48 PM CDT -----  Contact: self - [phone]  jennifer - calling about ct cd - did you get it - has questions - please call patient  At

## 2018-07-25 NOTE — TELEPHONE ENCOUNTER
Dr. Wallace,  Do you have the patient CD, She keeps calling and I sent the message to you on Monday Or Tuesday. She just want to know if you have it,   Please advise

## 2018-07-30 ENCOUNTER — TELEPHONE (OUTPATIENT)
Dept: GASTROENTEROLOGY | Facility: CLINIC | Age: 58
End: 2018-07-30

## 2018-07-30 NOTE — TELEPHONE ENCOUNTER
----- Message from Delmi White sent at 7/30/2018 12:42 PM CDT -----  Patient Requesting Appointment.     Reason for appt.: f/u  When is the first available appointment? N/A  Communication Preference: [phone]  Additional Information:

## 2018-08-02 ENCOUNTER — TELEPHONE (OUTPATIENT)
Dept: GASTROENTEROLOGY | Facility: CLINIC | Age: 58
End: 2018-08-02

## 2018-08-02 DIAGNOSIS — R13.10 DYSPHAGIA, UNSPECIFIED TYPE: Primary | ICD-10-CM

## 2018-08-02 NOTE — TELEPHONE ENCOUNTER
PLs let pt know that I reviewed CT w radiology and there is an abnormal blood vessel that is crossing over the esophagus and may be causing compression . We need to get a esophagogram and MBS  To evaluate this further.  Pls arrange.

## 2018-08-03 ENCOUNTER — TELEPHONE (OUTPATIENT)
Dept: GASTROENTEROLOGY | Facility: CLINIC | Age: 58
End: 2018-08-03

## 2018-08-03 NOTE — TELEPHONE ENCOUNTER
Will review with Dr. Gallo or Jose in vascular after evaluation completed.  PT is a poor surgical candidate per her Pulmonologist

## 2018-08-07 ENCOUNTER — TELEPHONE (OUTPATIENT)
Dept: GASTROENTEROLOGY | Facility: CLINIC | Age: 58
End: 2018-08-07

## 2018-08-07 NOTE — TELEPHONE ENCOUNTER
----- Message from Evelyn Hugo sent at 8/6/2018 11:42 AM CDT -----  Contact: Self- [phone]  Madison- pt called to speak with staff- trying to determine where she stands with her f/u appt- please contact pt at [phone]

## 2018-08-07 NOTE — TELEPHONE ENCOUNTER
Pt informed per Dr Wallace's recommendations dated 8/2/2018..  Attempted to schedule MBS and Esophagram with speech dept.  Message left on voicemail to contact pt directly to schedule.  Pt informed speech dept will contact her to schedule.  Understanding verbalized by pt.

## 2018-08-14 ENCOUNTER — TELEPHONE (OUTPATIENT)
Dept: SPEECH THERAPY | Facility: HOSPITAL | Age: 58
End: 2018-08-14

## 2018-09-04 ENCOUNTER — TELEPHONE (OUTPATIENT)
Dept: SPEECH THERAPY | Facility: HOSPITAL | Age: 58
End: 2018-09-04

## 2021-08-27 NOTE — TELEPHONE ENCOUNTER
Spoke with patient and patient reports she is doing a lot better. Wednesday her stomach was sore but she is not hurting now. She is able to eat and drink and has no nausea or vomiting.    Patient appreciates the call.   Cyclosporine Counseling:  I discussed with the patient the risks of cyclosporine including but not limited to hypertension, gingival hyperplasia,myelosuppression, immunosuppression, liver damage, kidney damage, neurotoxicity, lymphoma, and serious infections. The patient understands that monitoring is required including baseline blood pressure, CBC, CMP, lipid panel and uric acid, and then 1-2 times monthly CMP and blood pressure.